# Patient Record
Sex: MALE | Race: WHITE | NOT HISPANIC OR LATINO | URBAN - NONMETROPOLITAN AREA
[De-identification: names, ages, dates, MRNs, and addresses within clinical notes are randomized per-mention and may not be internally consistent; named-entity substitution may affect disease eponyms.]

---
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
## 2022-05-18 ENCOUNTER — OFFICE (OUTPATIENT)
Dept: URBAN - NONMETROPOLITAN AREA CLINIC 5 | Facility: CLINIC | Age: 22
End: 2022-05-18
Payer: COMMERCIAL

## 2022-05-18 VITALS
HEART RATE: 86 BPM | WEIGHT: 137 LBS | DIASTOLIC BLOOD PRESSURE: 77 MMHG | HEIGHT: 66 IN | BODY MASS INDEX: 22.02 KG/M2 | SYSTOLIC BLOOD PRESSURE: 120 MMHG | RESPIRATION RATE: 18 BRPM

## 2022-05-18 DIAGNOSIS — R19.5 OTHER FECAL ABNORMALITIES: ICD-10-CM

## 2022-05-18 DIAGNOSIS — K92.1 MELENA: ICD-10-CM

## 2022-05-18 DIAGNOSIS — R11.2 NAUSEA WITH VOMITING, UNSPECIFIED: ICD-10-CM

## 2022-05-18 DIAGNOSIS — R10.13 EPIGASTRIC PAIN: ICD-10-CM

## 2022-05-18 DIAGNOSIS — R63.4 ABNORMAL WEIGHT LOSS: ICD-10-CM

## 2022-05-18 PROCEDURE — 99204 OFFICE O/P NEW MOD 45 MIN: CPT | Performed by: INTERNAL MEDICINE

## 2022-05-18 NOTE — SERVICEHPINOTES
21-year-old male with no significant past medical history presents to reestablish care for abdominal pain, nausea, vomiting, and weight loss.  Patient reports he has significant stomach pain when he eats.  He can be detention through a sandwich and have significant burning which will cause him to stop eating.  Anything heavy causes an issue.  He is taking his PPI prior to his 1st meal today.  He reports a 30 lb weight loss over the last 2 months due to his symptoms.  He also can wake from sleep with significant burning in his abdomen which can cause him to vomit.  He typically moves his bowels once daily and it is usually loose stool.  He has never attempted soluble fiber for regularity.  He also reports seeing "dark black" stools.  He denies any bright red blood per rectum, dysphagia, odynophagia, or nocturnal BMs.  He denies any family history of inflammatory bowel disease.  He does endorse a history of 2 grandparents with colon cancer.  He does vape tobacco and smokes marijuana once weekly.  He rarely drinks alcohol and denies NSAID use.  He works  doing construction.  He was accompanied by his girlfriend today.  He last had an EGD in 2018 which showed nonerosive gastritis.  He was started on PPI at that time.

## 2022-05-18 NOTE — SERVICENOTES
Given patient's nausea, vomiting, epigastric pain will plan for EGD for further assessment.   timing of his PPI.  Will also give samples of FD Mayra.  In regards to patient's diarrhea daily of counseled soluble fiber use as above.  Does report melena and mucus in the stools.  Therefore will obtain a fecal calprotectin.  If found to be elevated will proceed with colonoscopy.  Otherwise will plan to see back in 3 months.

## 2022-05-23 ENCOUNTER — AMBULATORY SURGICAL CENTER (OUTPATIENT)
Dept: URBAN - NONMETROPOLITAN AREA SURGERY 4 | Facility: SURGERY | Age: 22
End: 2022-05-23
Payer: COMMERCIAL

## 2022-05-23 ENCOUNTER — OFFICE (OUTPATIENT)
Dept: URBAN - NONMETROPOLITAN AREA CLINIC 5 | Facility: CLINIC | Age: 22
End: 2022-05-23
Payer: COMMERCIAL

## 2022-05-23 VITALS
HEART RATE: 80 BPM | SYSTOLIC BLOOD PRESSURE: 108 MMHG | DIASTOLIC BLOOD PRESSURE: 90 MMHG | WEIGHT: 135 LBS | DIASTOLIC BLOOD PRESSURE: 58 MMHG | HEIGHT: 66 IN | RESPIRATION RATE: 13 BRPM | OXYGEN SATURATION: 97 % | OXYGEN SATURATION: 98 % | OXYGEN SATURATION: 96 % | OXYGEN SATURATION: 100 % | TEMPERATURE: 97.5 F | HEART RATE: 78 BPM | OXYGEN SATURATION: 98 % | DIASTOLIC BLOOD PRESSURE: 61 MMHG | RESPIRATION RATE: 16 BRPM | SYSTOLIC BLOOD PRESSURE: 113 MMHG | TEMPERATURE: 97.6 F | RESPIRATION RATE: 13 BRPM | DIASTOLIC BLOOD PRESSURE: 60 MMHG | SYSTOLIC BLOOD PRESSURE: 113 MMHG | DIASTOLIC BLOOD PRESSURE: 58 MMHG | DIASTOLIC BLOOD PRESSURE: 60 MMHG | OXYGEN SATURATION: 100 % | SYSTOLIC BLOOD PRESSURE: 106 MMHG | TEMPERATURE: 97.5 F | TEMPERATURE: 97.6 F | DIASTOLIC BLOOD PRESSURE: 74 MMHG | SYSTOLIC BLOOD PRESSURE: 108 MMHG | OXYGEN SATURATION: 99 % | SYSTOLIC BLOOD PRESSURE: 115 MMHG | HEART RATE: 80 BPM | RESPIRATION RATE: 16 BRPM | OXYGEN SATURATION: 98 % | SYSTOLIC BLOOD PRESSURE: 106 MMHG | WEIGHT: 135 LBS | SYSTOLIC BLOOD PRESSURE: 129 MMHG | HEART RATE: 78 BPM | SYSTOLIC BLOOD PRESSURE: 115 MMHG | RESPIRATION RATE: 17 BRPM | SYSTOLIC BLOOD PRESSURE: 108 MMHG | HEART RATE: 106 BPM | OXYGEN SATURATION: 99 % | SYSTOLIC BLOOD PRESSURE: 113 MMHG | DIASTOLIC BLOOD PRESSURE: 73 MMHG | DIASTOLIC BLOOD PRESSURE: 60 MMHG | DIASTOLIC BLOOD PRESSURE: 61 MMHG | HEART RATE: 80 BPM | HEART RATE: 66 BPM | HEART RATE: 94 BPM | TEMPERATURE: 97 F | HEART RATE: 78 BPM | DIASTOLIC BLOOD PRESSURE: 73 MMHG | WEIGHT: 135 LBS | RESPIRATION RATE: 17 BRPM | DIASTOLIC BLOOD PRESSURE: 74 MMHG | HEART RATE: 106 BPM | DIASTOLIC BLOOD PRESSURE: 58 MMHG | RESPIRATION RATE: 16 BRPM | HEART RATE: 66 BPM | DIASTOLIC BLOOD PRESSURE: 90 MMHG | HEART RATE: 66 BPM | OXYGEN SATURATION: 96 % | SYSTOLIC BLOOD PRESSURE: 129 MMHG | SYSTOLIC BLOOD PRESSURE: 129 MMHG | HEIGHT: 66 IN | SYSTOLIC BLOOD PRESSURE: 119 MMHG | DIASTOLIC BLOOD PRESSURE: 73 MMHG | SYSTOLIC BLOOD PRESSURE: 106 MMHG | TEMPERATURE: 97.6 F | DIASTOLIC BLOOD PRESSURE: 90 MMHG | TEMPERATURE: 97.5 F | OXYGEN SATURATION: 97 % | DIASTOLIC BLOOD PRESSURE: 74 MMHG | TEMPERATURE: 97 F | HEART RATE: 106 BPM | OXYGEN SATURATION: 100 % | RESPIRATION RATE: 13 BRPM | HEART RATE: 94 BPM | TEMPERATURE: 97 F | HEIGHT: 66 IN | HEART RATE: 94 BPM | RESPIRATION RATE: 17 BRPM | SYSTOLIC BLOOD PRESSURE: 115 MMHG | SYSTOLIC BLOOD PRESSURE: 119 MMHG | DIASTOLIC BLOOD PRESSURE: 61 MMHG | OXYGEN SATURATION: 96 % | OXYGEN SATURATION: 97 % | SYSTOLIC BLOOD PRESSURE: 119 MMHG | OXYGEN SATURATION: 99 %

## 2022-05-23 DIAGNOSIS — R10.13 EPIGASTRIC PAIN: ICD-10-CM

## 2022-05-23 DIAGNOSIS — R11.2 NAUSEA WITH VOMITING, UNSPECIFIED: ICD-10-CM

## 2022-05-23 DIAGNOSIS — K31.89 OTHER DISEASES OF STOMACH AND DUODENUM: ICD-10-CM

## 2022-05-23 DIAGNOSIS — R63.4 ABNORMAL WEIGHT LOSS: ICD-10-CM

## 2022-05-23 DIAGNOSIS — K92.1 MELENA: ICD-10-CM

## 2022-05-23 DIAGNOSIS — K21.9 GASTRO-ESOPHAGEAL REFLUX DISEASE WITHOUT ESOPHAGITIS: ICD-10-CM

## 2022-05-23 PROBLEM — T18.2XXA FOREIGN BODY IN STOMACH, INITIAL ENCOUNTER: Status: ACTIVE | Noted: 2022-05-23

## 2022-05-23 PROCEDURE — 43239 EGD BIOPSY SINGLE/MULTIPLE: CPT | Mod: SG | Performed by: INTERNAL MEDICINE

## 2022-05-23 PROCEDURE — 88305 TISSUE EXAM BY PATHOLOGIST: CPT | Performed by: INTERNAL MEDICINE

## 2022-05-23 PROCEDURE — 00731 ANES UPR GI NDSC PX NOS: CPT | Mod: P2,QZ | Performed by: NURSE ANESTHETIST, CERTIFIED REGISTERED

## 2022-05-23 PROCEDURE — 88313 SPECIAL STAINS GROUP 2: CPT | Performed by: INTERNAL MEDICINE

## 2022-05-23 PROCEDURE — 88342 IMHCHEM/IMCYTCHM 1ST ANTB: CPT | Performed by: INTERNAL MEDICINE

## 2022-05-23 PROCEDURE — 43239 EGD BIOPSY SINGLE/MULTIPLE: CPT | Performed by: INTERNAL MEDICINE

## 2022-05-23 PROCEDURE — 00731 ANES UPR GI NDSC PX NOS: CPT | Mod: QZ,P2 | Performed by: NURSE ANESTHETIST, CERTIFIED REGISTERED

## 2022-06-20 PROBLEM — K29.70 GASTRITIS, UNSPECIFIED, WITHOUT BLEEDING: Status: ACTIVE | Noted: 2018-06-21

## 2022-06-23 ENCOUNTER — OFFICE (OUTPATIENT)
Dept: URBAN - NONMETROPOLITAN AREA CLINIC 5 | Facility: CLINIC | Age: 22
End: 2022-06-23
Payer: COMMERCIAL

## 2022-06-23 VITALS
HEIGHT: 66 IN | RESPIRATION RATE: 18 BRPM | WEIGHT: 132 LBS | HEART RATE: 79 BPM | BODY MASS INDEX: 21.21 KG/M2 | SYSTOLIC BLOOD PRESSURE: 141 MMHG | DIASTOLIC BLOOD PRESSURE: 77 MMHG

## 2022-06-23 DIAGNOSIS — K92.1 MELENA: ICD-10-CM

## 2022-06-23 DIAGNOSIS — R63.4 ABNORMAL WEIGHT LOSS: ICD-10-CM

## 2022-06-23 DIAGNOSIS — R11.2 NAUSEA WITH VOMITING, UNSPECIFIED: ICD-10-CM

## 2022-06-23 PROCEDURE — 99214 OFFICE O/P EST MOD 30 MIN: CPT | Performed by: INTERNAL MEDICINE

## 2022-06-23 RX ORDER — ONDANSETRON HYDROCHLORIDE 4 MG/1
8 TABLET, FILM COATED ORAL
Qty: 30 | Refills: 2 | Status: ACTIVE
Start: 2022-06-23

## 2022-07-21 ENCOUNTER — OFFICE (OUTPATIENT)
Dept: URBAN - NONMETROPOLITAN AREA CLINIC 5 | Facility: CLINIC | Age: 22
End: 2022-07-21
Payer: COMMERCIAL

## 2022-07-21 VITALS
BODY MASS INDEX: 21.53 KG/M2 | DIASTOLIC BLOOD PRESSURE: 94 MMHG | SYSTOLIC BLOOD PRESSURE: 159 MMHG | HEIGHT: 66 IN | HEART RATE: 68 BPM | RESPIRATION RATE: 20 BRPM | WEIGHT: 134 LBS

## 2022-07-21 DIAGNOSIS — R63.4 ABNORMAL WEIGHT LOSS: ICD-10-CM

## 2022-07-21 DIAGNOSIS — R10.84 GENERALIZED ABDOMINAL PAIN: ICD-10-CM

## 2022-07-21 DIAGNOSIS — R11.2 NAUSEA WITH VOMITING, UNSPECIFIED: ICD-10-CM

## 2022-07-21 PROCEDURE — 99214 OFFICE O/P EST MOD 30 MIN: CPT | Performed by: INTERNAL MEDICINE

## 2022-07-21 NOTE — SERVICEHPINOTES
Carlos Albert   is a   21   year old  male  with no significant past medical history presents for follow up for abdominal pain, nausea, vomiting, and weight loss. Patient at initial visit reported significant stomach pain when he eats. He can be group home through a sandwich and have significant burning which will cause him to stop eating. Anything heavy causes an issue. He reported a 30 lb weight loss over the prior 2 months due to his symptoms. He also can wake from sleep with significant burning in his abdomen which can cause him to vomit. He does vape tobacco and smokes marijuana once weekly. He rarely drinks alcohol and denied NSAID use. 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 Food in the fundus. Normal mucosa in the fundus, body, and antrum. Normal mucosa in the whole examined duodenum. Normal mucosa in the esophagus.” Patient was counseled to avoid NSAIDs continue on a PPI. Subsequent CT abdomen pelvis was arranged given profound weight loss. CT abdomen pelvis was completed on 06/14/2022 and noted “there is a decrease in the distance between the superior mesenteric artery and aorta at the level where the duodenum crosses the midline. There appears to be minimal mass effect on the duodenum at this level and there is also slight dilation of left renal vein. Correlation with superior mesenteric artery syndrome is recommended.” Therefore subsequent small-bowel follow-through was arranged and was completed on 06/23/2022 and noted “the 3rd portion the duodenum is slightly prominent in size in the examination is otherwise unremarkable. This finding can be seen with superior mesenteric artery syndrome. Correlation with symptoms is recommended.” Patient also had a fecal calprotectin given reported diarrhea which was unremarkable. Patient at prior follow-up reported he had been mostly taking in liquid calories with [id number] calories a day. He reports he has a good appetite but he just can not bring himself to eat because of how much abdominal pain he develops. He was accompanied by his girlfriend. His aunt (Sandie from the [facility]) and mother were on speaker phone and questions were addressed. 
br
brSince last evaluation patient completed gastric emptying scan which was completed on 06/30 and noted “abnormal gastric emptying study with 60% of the original bolus remained within the stomach at 90 minutes. This is characteristic of mild gastroparesis. Patient complained of severe nausea prior to and during the exam.” Patient also completed a subsequent HIDA scan which showed normal gallbladder ejection fraction. Patient presents for follow-up today.   Patient continues to endorse significant abdominal pain and nausea every morning.  The pain does tend to be worse with eating.  It is just to the right of his umbilicus.  He has noted with putting pressure on a pressure point in his wrist that the pain improves.  Also improved with being on all fours.  The pain was worse with sticking his belly out.  Patient never been tested for porphyria.

## 2022-07-21 NOTE — SERVICENOTES
Given patient's persistent abdominal pain will rule out acute intermittent porphyria contributing.  Also counseled patient will start Reglan after obtaining the results of his EKG.  Will plan to see back in 1 month and if with persistent symptoms will plan for additional workup.

## 2022-08-25 ENCOUNTER — OFFICE (OUTPATIENT)
Dept: URBAN - NONMETROPOLITAN AREA CLINIC 5 | Facility: CLINIC | Age: 22
End: 2022-08-25
Payer: COMMERCIAL

## 2022-08-25 VITALS
HEIGHT: 66 IN | WEIGHT: 135 LBS | HEART RATE: 89 BPM | DIASTOLIC BLOOD PRESSURE: 98 MMHG | RESPIRATION RATE: 18 BRPM | SYSTOLIC BLOOD PRESSURE: 147 MMHG

## 2022-08-25 DIAGNOSIS — R10.84 GENERALIZED ABDOMINAL PAIN: ICD-10-CM

## 2022-08-25 PROCEDURE — 99213 OFFICE O/P EST LOW 20 MIN: CPT | Performed by: INTERNAL MEDICINE

## 2022-11-22 ENCOUNTER — OFFICE (OUTPATIENT)
Dept: URBAN - NONMETROPOLITAN AREA CLINIC 5 | Facility: CLINIC | Age: 22
End: 2022-11-22
Payer: COMMERCIAL

## 2022-11-22 VITALS
RESPIRATION RATE: 18 BRPM | WEIGHT: 142 LBS | DIASTOLIC BLOOD PRESSURE: 90 MMHG | BODY MASS INDEX: 22.82 KG/M2 | SYSTOLIC BLOOD PRESSURE: 139 MMHG | HEART RATE: 89 BPM | HEIGHT: 66 IN

## 2022-11-22 DIAGNOSIS — K31.84 GASTROPARESIS: ICD-10-CM

## 2022-11-22 DIAGNOSIS — R10.13 EPIGASTRIC PAIN: ICD-10-CM

## 2022-11-22 PROCEDURE — 99213 OFFICE O/P EST LOW 20 MIN: CPT | Performed by: INTERNAL MEDICINE

## 2022-11-22 NOTE — SERVICENOTES
Patient reports he has missed doses without significant worsening of his symptoms.  Counseled him he could attempt to minimize the Reglan to assess for worsening of his symptoms.  He could attempt only take it once daily prior to his largest meal and then eventually attempt to taper off to see if he is able to go without it.  Otherwise will plan to see back in clinic in 6 months.

## 2022-11-22 NOTE — SERVICEHPINOTES
Carlos Albert   is a   22   year old  male   with no significant past medical history who presents for follow up for abdominal pain, nausea, vomiting, and weight loss. Patient at initial visit reported significant stomach pain when he eats. He can be correction through a sandwich and have significant burning which will cause him to stop eating. Anything heavy causes an issue. He reported a 30 lb weight loss over the prior 2 months due to his symptoms. He also can wake from sleep with significant burning in his abdomen which can cause him to vomit. He does vape tobacco and smokes marijuana once weekly. He rarely drinks alcohol and denied NSAID use. 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 Food in the fundus. Normal mucosa in the fundus, body, and antrum. Normal mucosa in the whole examined duodenum. Normal mucosa in the esophagus.” Patient was counseled to avoid NSAIDs continue on a PPI. Subsequent CT abdomen pelvis was arranged given profound weight loss. CT abdomen pelvis was completed on 06/14/2022 and noted “there is a decrease in the distance between the superior mesenteric artery and aorta at the level where the duodenum crosses the midline. There appears to be minimal mass effect on the duodenum at this level and there is also slight dilation of left renal vein. Correlation with superior mesenteric artery syndrome is recommended.” Therefore subsequent small-bowel follow-through was arranged and was completed on 06/23/2022 and noted “the 3rd portion the duodenum is slightly prominent in size in the examination is otherwise unremarkable. This finding can be seen with superior mesenteric artery syndrome. Correlation with symptoms is recommended.” Patient also had a fecal calprotectin given reported diarrhea which was unremarkable. Patient at prior follow-up reported he had been mostly taking in liquid calories with [id number] calories a day. He reports he has a good appetite but he just can not bring himself to eat because of how much abdominal pain he develops. He was accompanied by his girlfriend.  Patient completed gastric emptying scan which was completed on 06/30 and noted “abnormal gastric emptying study with 60% of the original bolus remained within the stomach at 90 minutes. This is characteristic of mild gastroparesis. Patient complained of severe nausea prior to and during the exam.” Patient also completed a subsequent HIDA scan which showed normal gallbladder ejection fraction.   Patient presents follow-up today.  
br
br Patient today reports he continues to do well with Reglan.  He has gained an additional 7 lbs.  He denies any nausea or vomiting or abdominal pain.  He has missed doses of the Reglan and has not seen any worsening of his symptoms.  He typically takes it 2 times daily.  He  reports overall he feels significantly better and denies any complaints today.

## 2023-05-18 ENCOUNTER — OFFICE (OUTPATIENT)
Dept: URBAN - NONMETROPOLITAN AREA CLINIC 5 | Facility: CLINIC | Age: 23
End: 2023-05-18

## 2024-05-15 ENCOUNTER — OFFICE (OUTPATIENT)
Dept: URBAN - NONMETROPOLITAN AREA CLINIC 5 | Facility: CLINIC | Age: 24
End: 2024-05-15
Payer: COMMERCIAL

## 2024-05-15 VITALS
WEIGHT: 126 LBS | DIASTOLIC BLOOD PRESSURE: 92 MMHG | HEART RATE: 106 BPM | HEIGHT: 66 IN | SYSTOLIC BLOOD PRESSURE: 143 MMHG | RESPIRATION RATE: 19 BRPM

## 2024-05-15 DIAGNOSIS — K31.84 GASTROPARESIS: ICD-10-CM

## 2024-05-15 DIAGNOSIS — R11.2 NAUSEA WITH VOMITING, UNSPECIFIED: ICD-10-CM

## 2024-05-15 DIAGNOSIS — R10.13 EPIGASTRIC PAIN: ICD-10-CM

## 2024-05-15 DIAGNOSIS — R63.4 ABNORMAL WEIGHT LOSS: ICD-10-CM

## 2024-05-15 DIAGNOSIS — F12.188 CANNABIS ABUSE WITH OTHER CANNABIS-INDUCED DISORDER: ICD-10-CM

## 2024-05-15 PROCEDURE — 99213 OFFICE O/P EST LOW 20 MIN: CPT | Performed by: INTERNAL MEDICINE

## 2024-05-15 RX ORDER — FAMOTIDINE 40 MG/1
40 TABLET, FILM COATED ORAL
Qty: 30 | Refills: 11 | Status: ACTIVE
Start: 2024-05-15

## 2024-05-15 RX ORDER — PANTOPRAZOLE SODIUM 40 MG/1
40 TABLET, DELAYED RELEASE ORAL
Qty: 30 | Refills: 11 | Status: ACTIVE
Start: 2024-05-15

## 2024-05-15 NOTE — SERVICEHPINOTES
Carlos Albert   is a   23   year old  male  with no significant past medical history who presents for follow up for abdominal pain, nausea, vomiting, and weight loss. Patient at initial visit reported significant stomach pain when he eats. He can be nursing home through a sandwich and have significant burning which would cause him to stop eating. Anything heavy caused an issue. He reported a 30 lb weight loss over the prior 2 months due to his symptoms. He could also can wake from sleep with significant burning in his abdomen which would cause him to vomit. He vapes tobacco and smoked marijuana once weekly.  Patient after initial visit completed an EGD on 05/23/2022 which revealed “irregular Z-line. Food in the fundus. Normal mucosa in the fundus, body, and antrum. Normal mucosa in the whole examined duodenum. Normal mucosa in the esophagus.” Patient was counseled to avoid NSAIDs, and continue on a PPI. Subsequent CT abdomen pelvis was arranged given profound weight loss and was completed on 06/14/2022 and noted “there is a decrease in the distance between the superior mesenteric artery and aorta at the level where the duodenum crosses the midline. There appears to be minimal mass effect on the duodenum at this level and there is also slight dilation of left renal vein. Correlation with superior mesenteric artery syndrome is recommended.” Therefore subsequent small-bowel follow-through was arranged and was completed on 06/23/2022 and noted “the 3rd portion the duodenum is slightly prominent in size but the examination is otherwise unremarkable. This finding can be seen with superior mesenteric artery syndrome. Correlation with symptoms is recommended.” Patient also had a fecal calprotectin given reported diarrhea which was unremarkable. Patient completed gastric emptying scan which was completed on 06/30/2022 and noted “abnormal gastric emptying study with 60% of the original bolus remained within the stomach at 90 minutes. This is characteristic of mild gastroparesis. Patient complained of severe nausea prior to and during the exam.” Patient also completed a subsequent HIDA scan which showed normal gallbladder ejection fraction.    Patient at prior follow up in November 2022 reported he had been doing well with Reglan. He had gained 7 lbs. He denied any nausea, vomiting, or abdominal pain. He had missed doses frequently of the Reglan and had not noted any worsening of his symptoms. He typically was taking it 2 times daily. He reported overall he felt significantly better and denied any complaints. Patient has not been seen in the clinic since that time..
br
br   Patient presents for follow up today and reports that he had been doing well until 2 months ago when he woke with severe abdominal pain.  It was pain he had never felt before.  It was a stabbing pain in his epigastric region with radiation into his back.  He went to the ER in [place] where he was given potassium and discharged home.  Twnety minutes later he began to have severe vomiting and presented to the ER in [place].  There had a CT scan which showed bladder distention but was otherwise unremarkable.  He was given IV fluids for significant dehydration.  He denies experiencing similar symptoms in the past.  He reports he has been off Reglan for quite some time.  He continues to smoke marijuana frequently.  He has marijuana vape pen and smokes it every other day.  He denies any dysphagia, odynophagia, diarrhea, or constipation.  He does report irregular bowel habits.  He has never been on a fiber supplement daily.  He has never been on PPI in the morning or Pepcid at bedtime.  He reports he is significantly better than he was when he was previously seen 2 years ago.

## 2024-05-15 NOTE — SERVICENOTES
Given patient's recurrent episode of severe epigastric pain with associated nausea and vomiting as well as daily "queasiness" in the morning counseled him on possible cannabis hyperemesis contributing given frequent marijuana use.  Recommend he would completely discontinue marijuana.  Will start on a PPI every morning 30-45 minutes prior to breakfast and Pepcid at bedtime.  Will plan to see back in clinic in 4 months to assess symptoms.  Given patient has regular bowel habits have counseled him on attempting a soluble fiber supplement as outlined above.

## 2024-12-11 ENCOUNTER — OFFICE (OUTPATIENT)
Dept: URBAN - NONMETROPOLITAN AREA CLINIC 5 | Facility: CLINIC | Age: 24
End: 2024-12-11
Payer: COMMERCIAL

## 2024-12-11 VITALS
HEART RATE: 98 BPM | SYSTOLIC BLOOD PRESSURE: 150 MMHG | WEIGHT: 137 LBS | DIASTOLIC BLOOD PRESSURE: 75 MMHG | RESPIRATION RATE: 20 BRPM | HEIGHT: 66 IN

## 2024-12-11 DIAGNOSIS — F12.188 CANNABIS ABUSE WITH OTHER CANNABIS-INDUCED DISORDER: ICD-10-CM

## 2024-12-11 DIAGNOSIS — R10.13 EPIGASTRIC PAIN: ICD-10-CM

## 2024-12-11 DIAGNOSIS — R10.84 GENERALIZED ABDOMINAL PAIN: ICD-10-CM

## 2024-12-11 DIAGNOSIS — R11.2 NAUSEA WITH VOMITING, UNSPECIFIED: ICD-10-CM

## 2024-12-11 PROCEDURE — 99212 OFFICE O/P EST SF 10 MIN: CPT | Performed by: INTERNAL MEDICINE

## 2024-12-11 NOTE — SERVICEHPINOTES
Carlos Albert   is a   24   year old  male   with no significant past medical history who presents for follow up for abdominal pain, nausea, vomiting, and weight loss. Patient at initial visit reported significant stomach pain when he eats. He can be long-term through a sandwich and have significant burning which would cause him to stop eating. Anything heavy caused an issue. He reported a 30 lb weight loss over the prior 2 months due to his symptoms. He could also can wake from sleep with significant burning in his abdomen which would cause him to vomit. He vapes tobacco and smoked marijuana once weekly.  Patient after initial visit completed an EGD on 05/23/2022 which revealed “irregular Z-line. Food in the fundus. Normal mucosa in the fundus, body, and antrum. Normal mucosa in the whole examined duodenum. Normal mucosa in the esophagus.” Patient was counseled to avoid NSAIDs, and continue on a PPI. Subsequent CT abdomen pelvis was arranged given profound weight loss and was completed on 06/14/2022 and noted “there is a decrease in the distance between the superior mesenteric artery and aorta at the level where the duodenum crosses the midline. There appears to be minimal mass effect on the duodenum at this level and there is also slight dilation of left renal vein. Correlation with superior mesenteric artery syndrome is recommended.” Therefore subsequent small-bowel follow-through was arranged and was completed on 06/23/2022 and noted “the 3rd portion the duodenum is slightly prominent in size but the examination is otherwise unremarkable. This finding can be seen with superior mesenteric artery syndrome. Correlation with symptoms is recommended.” Patient also had a fecal calprotectin given reported diarrhea which was unremarkable. Patient completed gastric emptying scan which was completed on 06/30/2022 and noted “abnormal gastric emptying study with 60% of the original bolus remained within the stomach at 90 minutes. This is characteristic of mild gastroparesis. Patient complained of severe nausea prior to and during the exam.” Patient also completed a subsequent HIDA scan which showed normal gallbladder ejection fraction.    Patient at prior follow up in November 2022 reported he had been doing well with Reglan. He had gained 7 lbs. He denied any nausea, vomiting, or abdominal pain. He had missed doses frequently of the Reglan and had not noted any worsening of his symptoms. He typically was taking it 2 times daily. He reported overall he felt significantly better and denied any complaints. Patient has not been seen in the clinic since that time..Patient at prior follow-up reported he had been doing well until 2 months prior when he woke with severe abdominal pain that was a stabbing pain like he had never felt before.  He went to the ER in [place] where he was given potassium and discharged home.  20 minutes later he had recurrence of the pain and went to the ER in [place].  There he had a CT scan which showed bladder distention but otherwise was unremarkable.  He reported he had been off his Reglan for quite some time and continued to smoke marijuana frequently.  He reported he was significantly better than he had been 2 years prior when he was seen.  Patient was counseled extensively on the need for complete marijuana cessation and to attempt antacids and restart his Reglan.Patient at follow up visit on 9/11/2024 reported he was finally able to stop smoking marijuana 2 weeks prior.  Since then every morning he would wake with severe nausea.  Due to the nausea he was not able to eat until lunchtime.  Therefore he discontinued the omeprazole.  He was only taking Reglan when he had symptoms.  He would often be long-term through a meal when he would feel full and then take the Reglan.  He had not tried to schedule it before his meals.  He had no further episodes of severe epigastric pain like at his previous visit. 
manjinder dunbarPatient presents for follow-up today.  He has been off the Reglan for 2 months.  He has remained off the marijuana.  He is no longer taking omeprazole or Carafate.  He has no symptoms.  He has been "eating like a med man".  He has gained 17 lb since last evaluation.  He does believe that he had cannabis hyperemesis syndrome.  It took a full 2.5 months of abstinence for his symptoms to completely resolve but he feels great now.  He was accompanied again by his girlfriend today. [email or web address] dunbar

## 2024-12-11 NOTE — SERVICENOTES
Patient's symptoms have completely resolved with complete abstinence from marijuana.  Will plan to see back in clinic as needed.